# Patient Record
Sex: MALE | Race: WHITE | NOT HISPANIC OR LATINO | Employment: FULL TIME | URBAN - METROPOLITAN AREA
[De-identification: names, ages, dates, MRNs, and addresses within clinical notes are randomized per-mention and may not be internally consistent; named-entity substitution may affect disease eponyms.]

---

## 2017-01-05 ENCOUNTER — TRANSFERRED RECORDS (OUTPATIENT)
Dept: HEALTH INFORMATION MANAGEMENT | Facility: CLINIC | Age: 27
End: 2017-01-05

## 2017-01-09 ENCOUNTER — OFFICE VISIT (OUTPATIENT)
Dept: FAMILY MEDICINE | Facility: CLINIC | Age: 27
End: 2017-01-09
Payer: COMMERCIAL

## 2017-01-09 VITALS
OXYGEN SATURATION: 96 % | TEMPERATURE: 98 F | HEIGHT: 73 IN | BODY MASS INDEX: 31.51 KG/M2 | WEIGHT: 237.8 LBS | HEART RATE: 63 BPM | SYSTOLIC BLOOD PRESSURE: 120 MMHG | DIASTOLIC BLOOD PRESSURE: 72 MMHG

## 2017-01-09 DIAGNOSIS — I82.4Z9 DEEP VEIN THROMBOSIS (DVT) OF DISTAL VEIN OF LOWER EXTREMITY, UNSPECIFIED CHRONICITY, UNSPECIFIED LATERALITY (H): ICD-10-CM

## 2017-01-09 DIAGNOSIS — Z01.818 PREOP GENERAL PHYSICAL EXAM: Primary | ICD-10-CM

## 2017-01-09 DIAGNOSIS — S93.401A SPRAIN OF RIGHT ANKLE, UNSPECIFIED LIGAMENT, INITIAL ENCOUNTER: ICD-10-CM

## 2017-01-09 LAB — HGB BLD-MCNC: 14.2 G/DL (ref 13.3–17.7)

## 2017-01-09 PROCEDURE — 85018 HEMOGLOBIN: CPT | Performed by: PHYSICIAN ASSISTANT

## 2017-01-09 PROCEDURE — 80048 BASIC METABOLIC PNL TOTAL CA: CPT | Performed by: PHYSICIAN ASSISTANT

## 2017-01-09 PROCEDURE — 99214 OFFICE O/P EST MOD 30 MIN: CPT | Performed by: PHYSICIAN ASSISTANT

## 2017-01-09 PROCEDURE — 36415 COLL VENOUS BLD VENIPUNCTURE: CPT | Performed by: PHYSICIAN ASSISTANT

## 2017-01-09 NOTE — NURSING NOTE
"Chief Complaint   Patient presents with     Pre-Op Exam       Initial /72 mmHg  Pulse 63  Temp(Src) 98  F (36.7  C) (Oral)  Ht 6' 1\" (1.854 m)  Wt 237 lb 12.8 oz (107.865 kg)  BMI 31.38 kg/m2  SpO2 96% Estimated body mass index is 31.38 kg/(m^2) as calculated from the following:    Height as of this encounter: 6' 1\" (1.854 m).    Weight as of this encounter: 237 lb 12.8 oz (107.865 kg).  BP completed using cuff size: mike aPul CMA        "

## 2017-01-09 NOTE — PROGRESS NOTES
[facility]  [address] [address] [phone]  Dept: [phone]    PRE-OP EVALUATION:  Today's date: 2017    Monty Evans (: 1990) presents for pre-operative evaluation assessment as requested by Dr. Stearns.  He requires evaluation and anesthesia risk assessment prior to undergoing surgery/procedure for treatment of  left ankle .  Proposed procedure: full reconstructive surgery of left ankle    Date of Surgery/ Procedure: 17  Time of Surgery/ Procedure: 9:00am  Hospital[facility]Surgical [facility]  Fax number for surgical facility: [phone]  Primary Physician: Sarah Rhodes  Type of Anesthesia Anticipated: to be determined    Patient has a Health Care Directive or Living Will:  NO    1. NO - Do you have a history of heart attack, stroke, stent, bypass or surgery on an artery in the head, neck, heart or legs?  2. NO - Do you ever have any pain or discomfort in your chest?  3. NO - Do you have a history of  Heart Failure?  4. NO - Are you troubled by shortness of breath when: walking on the level, up a slight hill or at night?  5. NO - Do you currently have a cold, bronchitis or other respiratory infection?  6. NO - Do you have a cough, shortness of breath or wheezing?  7. NO - Do you sometimes get pains in the calves of your legs when you walk?  8. NO - Do you or anyone in your family have previous history of blood clots?  9. NO - Do you or does anyone in your family have a serious bleeding problem such as prolonged bleeding following surgeries or cuts?  10. NO - Have you ever had problems with anemia or been told to take iron pills?  11. NO - Have you had any abnormal blood loss such as black, tarry or bloody stools, or abnormal vaginal bleeding?  12. NO - Have you ever had a blood transfusion?  13. NO - Have you or any of your relatives ever had problems with anesthesia?  14. YES - DO YOU HAVE SLEEP  APNEA, EXCESSIVE SNORING OR DAYTIME DROWSINESS? Does snore quite regularly, no dx of GRAY  15. NO - Do you have any prosthetic heart valves?  16. NO - Do you have prosthetic joints?  17. NO - Is there any chance that you may be pregnant?      HPI:                                                      Brief HPI related to upcoming procedure: Patient was playing softball when he injured his left ankle injury - he notes he reportedly tore all of his supporting ligaments. Did a lot physical therapy initially but this was not fully effective. He has pain daily when walking      See problem list for active medical problems.  Problems all longstanding and stable, except as noted/documented.  See ROS for pertinent symptoms related to these conditions.                                                                                                  .    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 Provider to review        Past Medical History   Diagnosis Date     DVT, lower extremity (H) 3/19/2014     Past Surgical History   Procedure Laterality Date     Shoulder surgery  2008     labral tear-right -surgery     Surgical history of -   3/14/14     Right ACL repair     Current Outpatient Prescriptions   Medication Sig Dispense Refill     acetaminophen (TYLENOL) 325  "MG tablet Take 2 tablets (650 mg) by mouth every 4 hours as needed for mild pain 100 tablet 0     EPINEPHrine (EPIPEN) 0.3 MG/0.3ML injection Inject 0.3 mLs into the muscle once as needed. 2 each 1     OTC products: None, except as noted above    Allergies   Allergen Reactions     Ibuprofen Sodium Anaphylaxis     Bacitracin      Neosporin [Neomycin-Polymyx-Gramicid]       Latex Allergy: NO    Social History   Substance Use Topics     Smoking status: Never Smoker      Smokeless tobacco: Never Used     Alcohol Use: 2.5 oz/week     5 drink(s) per week      Comment: 1-2 weekly     History   Drug Use No       REVIEW OF SYSTEMS:                                                    C: NEGATIVE for fever, chills, change in weight  I: NEGATIVE for worrisome rashes, moles or lesions  E: NEGATIVE for vision changes or irritation  E/M: NEGATIVE for ear, mouth and throat problems  R: NEGATIVE for significant cough or SOB  RESP:NEGATIVE for significant cough or SOB  B: NEGATIVE for masses, tenderness or discharge  CV: NEGATIVE for chest pain, palpitations or peripheral edema  GI: NEGATIVE for nausea, abdominal pain, heartburn, or change in bowel habits  : NEGATIVE for frequency, dysuria, or hematuria  MUSCULOSKELETAL: bilateral ankle pain  N: NEGATIVE for weakness, dizziness or paresthesias  E: NEGATIVE for temperature intolerance, skin/hair changes  HEME/ALLERGY/IMMUNE: POSITIVE for dvt post-op acl repair  P: NEGATIVE for changes in mood or affect    EXAM:                                                    /72 mmHg  Pulse 63  Temp(Src) 98  F (36.7  C) (Oral)  Ht 6' 1\" (1.854 m)  Wt 237 lb 12.8 oz (107.865 kg)  BMI 31.38 kg/m2  SpO2 96%    GENERAL APPEARANCE: healthy, alert and no distress     EYES: EOMI, - PERRL     HENT: ear canals and TM's normal and nose and mouth without ulcers or lesions     RESP: lungs clear to auscultation - no rales, rhonchi or wheezes     CV: regular rates and rhythm, normal S1 S2, no S3 or " S4 and no murmur, click or rub -     ABDOMEN:  soft, nontender, no HSM or masses and bowel sounds normal     MS: no gross swelling to the bilateral ankles, without peripheral edema; no evidence for mass  in LE     SKIN: no suspicious lesions or rashes     PSYCH: mentation appears normal. and affect normal/bright    DIAGNOSTICS:                                                      Labs Resulted Today:   Results for orders placed or performed in visit on 01/09/17   Hemoglobin   Result Value Ref Range    Hemoglobin 14.2 13.3 - 17.7 g/dL   Basic metabolic panel   Result Value Ref Range    Sodium 139 133 - 144 mmol/L    Potassium 4.1 3.4 - 5.3 mmol/L    Chloride 104 94 - 109 mmol/L    Carbon Dioxide 26 20 - 32 mmol/L    Anion Gap 9 3 - 14 mmol/L    Glucose 81 70 - 99 mg/dL    Urea Nitrogen 22 7 - 30 mg/dL    Creatinine 1.20 0.66 - 1.25 mg/dL    GFR Estimate 73 >60 mL/min/1.7m2    GFR Estimate If Black 88 >60 mL/min/1.7m2    Calcium 9.4 8.5 - 10.1 mg/dL       IMPRESSION:                                                    Reason for surgery/procedure: Left ankle reconstruction/repair  Diagnosis/reason for consult: Pre-operative evaluation    The proposed surgical procedure is considered INTERMEDIATE risk.    REVISED CARDIAC RISK INDEX  The patient has the following serious cardiovascular risks for perioperative complications such as: Hx of DVT following acl repair    INTERPRETATION: 1 risks: Class II (low risk - 0.9% complication rate)    The patient has the following additional risks for perioperative complications:  No identified additional risks      ICD-10-CM    1. Preop general physical exam Z01.818 Hemoglobin     Basic metabolic panel     DISCONTINUED: enoxaparin (LOVENOX) 40 MG/0.4ML injection   2. Sprain of right ankle, unspecified ligament, initial encounter S93.401A    3. Deep vein thrombosis (DVT) of distal vein of lower extremity, unspecified chronicity, unspecified laterality (H) I82.4Z9 Hemoglobin     Basic  metabolic panel     enoxaparin (LOVENOX) 40 MG/0.4ML injection     DISCONTINUED: enoxaparin (LOVENOX) 40 MG/0.4ML injection       RECOMMENDATIONS:                                                      --Because of DVT, patient will be started on low molecular weight heparin after surgery    Obstructive Sleep Apnea (or suspected sleep apnea)  Hospital staff are advised to monitor for sleep related oxygen desaturations due to suspicion of GRAY - patient reported    --Patient is to take all scheduled medications on the day of surgery EXCEPT for modifications listed below - Patient does NOT take medications currently    APPROVAL GIVEN to proceed with proposed procedure, without further diagnostic evaluation       Signed Electronically by: Jaleel Lucas PA-C    Copy of this evaluation report is provided to requesting physician.    Amy Preop Guidelines

## 2017-01-09 NOTE — MR AVS SNAPSHOT
After Visit Summary   1/9/2017    Monty Evans    MRN: [id number]           Patient Information     Date Of Birth          1990        Visit Information        Provider Department      1/9/2017 4:00 PM Jaleel Lucas PA-C [facility]        Today's Diagnoses     Preop general physical exam    -  1     Sprain of right ankle, unspecified ligament, initial encounter         Deep vein thrombosis (DVT) of distal vein of lower extremity, unspecified chronicity, unspecified laterality (H)           Care Instructions      Before Your Surgery      Call your surgeon if there is any change in your health. This includes signs of a cold or flu (such as a sore throat, runny nose, cough, rash or fever).    Do not smoke, drink alcohol or take over the counter medicine (unless your surgeon or primary care doctor tells you to) for the 24 hours before and after surgery.    If you take prescribed drugs: Follow your doctor s orders about which medicines to take and which to stop until after surgery.    Eating and drinking prior to surgery: follow the instructions from your surgeon    Take a shower or bath the night before surgery. Use the soap your surgeon gave you to gently clean your skin. If you do not have soap from your surgeon, use your regular soap. Do not shave or scrub the surgery site.  Wear clean pajamas and have clean sheets on your bed.         Follow-ups after your visit        Who to contact     If you have questions or need follow up information about today's clinic visit or your schedule please contact [facility] directly at [phone].  Normal or non-critical lab and imaging results will be communicated to you by MyChart, letter or phone within 4 business days after the clinic has received the results. If you do not hear from us within 7 days, please contact the clinic through MyChart or phone. If you have a critical or abnormal lab result, we will notify  "you by phone as soon as possible.  Submit refill requests through [employer] or call your pharmacy and they will forward the refill request to us. Please allow 3 business days for your refill to be completed.          Additional Information About Your Visit        Email Data [employer] Information     [employer] lets you send messages to your doctor, view your test results, renew your prescriptions, schedule appointments and more. To sign up, go to [email or web address] . Click on \"Log in\" on the left side of the screen, which will take you to the Welcome page. Then click on \"Sign up Now\" on the right side of the page.     You will be asked to enter the access code listed below, as well as some personal information. Please follow the directions to create your username and password.     Your access code is: [email or web address]  Expires: 2017  4:33 PM     Your access code will  in 90 days. If you need help or a new code, please call your [place] clinic or [phone].        Care EveryWhere ID     This is your Care EveryWhere ID. This could be used by other organizations to access your [place] medical records  [id number]        Your Vitals Were     Pulse Temperature Height BMI (Body Mass Index) Pulse Oximetry       63 98  F (36.7  C) (Oral) 6' 1\" (1.854 m) 31.38 kg/m2 96%        Blood Pressure from Last 3 Encounters:   17 120/72   14 118/62   14 112/70    Weight from Last 3 Encounters:   17 237 lb 12.8 oz (107.865 kg)   14 211 lb 11.2 oz (96.026 kg)   14 211 lb (95.709 kg)              We Performed the Following     Basic metabolic panel     Hemoglobin          Today's Medication Changes          These changes are accurate as of: 17  4:33 PM.  If you have any questions, ask your nurse or doctor.               Start taking these medicines.        Dose/Directions    enoxaparin 40 MG/0.4ML injection   Commonly known as:  LOVENOX   Used for:  Deep vein thrombosis (DVT) of distal vein of lower " extremity, unspecified chronicity, unspecified laterality (H), Preop general physical exam   Started by:  Jaleel Lucas PA-C        Dose:  1 mg/kg   Inject 1.08 mLs (108 mg) Subcutaneous daily for 14 days   Quantity:  15.12 mL   Refills:  0            Where to get your medicines      These medications were sent to [facility] [place] - [address]  [address] [address] [address]     Phone:  [phone]    - enoxaparin 40 MG/0.4ML injection             Primary Care Provider Office Phone # Fax #    Sarah Rhodes [phone]       [facility] [address] [facility] [address] [address]        Thank you!     Thank you for choosing [facility]  for your care. Our goal is always to provide you with excellent care. Hearing back from our patients is one way we can continue to improve our services. Please take a few minutes to complete the written survey that you may receive in the mail after your visit with us. Thank you!             Your Updated Medication List - Protect others around you: Learn how to safely use, store and throw away your medicines at www.disposemymeds.org.          This list is accurate as of: 1/9/17  4:33 PM.  Always use your most recent med list.                   Brand Name Dispense Instructions for use    acetaminophen 325 MG tablet    TYLENOL    100 tablet    Take 2 tablets (650 mg) by mouth every 4 hours as needed for mild pain       enoxaparin 40 MG/0.4ML injection    LOVENOX    15.12 mL    Inject 1.08 mLs (108 mg) Subcutaneous daily for 14 days       EPINEPHrine 0.3 MG/0.3ML injection    EPIPEN    2 each    Inject 0.3 mLs into the muscle once as needed.

## 2017-01-10 LAB
ANION GAP SERPL CALCULATED.3IONS-SCNC: 9 MMOL/L (ref 3–14)
BUN SERPL-MCNC: 22 MG/DL (ref 7–30)
CALCIUM SERPL-MCNC: 9.4 MG/DL (ref 8.5–10.1)
CHLORIDE SERPL-SCNC: 104 MMOL/L (ref 94–109)
CO2 SERPL-SCNC: 26 MMOL/L (ref 20–32)
CREAT SERPL-MCNC: 1.2 MG/DL (ref 0.66–1.25)
GFR SERPL CREATININE-BSD FRML MDRD: 73 ML/MIN/1.7M2
GLUCOSE SERPL-MCNC: 81 MG/DL (ref 70–99)
POTASSIUM SERPL-SCNC: 4.1 MMOL/L (ref 3.4–5.3)
SODIUM SERPL-SCNC: 139 MMOL/L (ref 133–144)

## 2017-02-16 ENCOUNTER — THERAPY VISIT (OUTPATIENT)
Dept: PHYSICAL THERAPY | Facility: CLINIC | Age: 27
End: 2017-02-16
Payer: COMMERCIAL

## 2017-02-16 DIAGNOSIS — Z47.89 AFTERCARE FOLLOWING SURGERY OF THE MUSCULOSKELETAL SYSTEM: ICD-10-CM

## 2017-02-16 DIAGNOSIS — M25.572 ACUTE LEFT ANKLE PAIN: Primary | ICD-10-CM

## 2017-02-16 PROCEDURE — 97110 THERAPEUTIC EXERCISES: CPT | Mod: GP | Performed by: PHYSICAL THERAPIST

## 2017-02-16 PROCEDURE — 97161 PT EVAL LOW COMPLEX 20 MIN: CPT | Mod: GP | Performed by: PHYSICAL THERAPIST

## 2017-02-16 NOTE — MR AVS SNAPSHOT
After Visit Summary   2/16/2017    Monty Evans    MRN: [id number]           Patient Information     Date Of Birth          1990        Visit Information        Provider Department      2/16/2017 8:00 AM Anastacia Heredia, PT [facility] [place] [facility]        Today's Diagnoses     Acute left ankle pain    -  1    Aftercare following surgery of the musculoskeletal system           Follow-ups after your visit        Your next 10 appointments already scheduled     Feb 23, 2017  8:40 AM CST   NATA Extremity with Anastacia Heredia PT   [facility] [place] [facility] ([facility] [place])    [address]  [place] [address]   [phone]            Mar 03, 2017  8:20 AM CST   NATA Extremity with Anastacia Heredia PT   [facility] [place] [facility] ([facility] [place])    [address]  [place] [address]   [phone]            Mar 09, 2017  8:40 AM CST   NATA Extremity with Anastacia Heredia PT   [facility] [place] [facility] ([facility] [place])    [address]  [place] [address]   [phone]            Mar 16, 2017  8:40 AM CDT   NATA Extremity with Anastacia Heredia PT   [facility] [place] [facility] ([facility] [place])    [address]  [place] [address]   [phone]            Mar 23, 2017  8:40 AM CDT   NATA Extremity with Anastacia Heredia PT   [facility] [place] [facility] ([facility] [place])    [address]  [place] [address]   [phone]            Mar 30, 2017  8:40 AM CDT   NATA Extremity with Anastacia Heredia PT   [facility] [place] [facility] ([facility] [place])    [address]  [place] [address]   [phone]              Who to contact     If you have questions or need follow up information about today's clinic visit or your schedule please contact [facility] directly at [phone].  Normal or non-critical lab and imaging  "results will be communicated to you by MyChart, letter or phone within 4 business days after the clinic has received the results. If you do not hear from us within 7 days, please contact the clinic through [employer] or phone. If you have a critical or abnormal lab result, we will notify you by phone as soon as possible.  Submit refill requests through [employer] or call your pharmacy and they will forward the refill request to us. Please allow 3 business days for your refill to be completed.          Additional Information About Your Visit        [employer] Information     [employer] lets you send messages to your doctor, view your test results, renew your prescriptions, schedule appointments and more. To sign up, go to [email or web address] . Click on \"Log in\" on the left side of the screen, which will take you to the Welcome page. Then click on \"Sign up Now\" on the right side of the page.     You will be asked to enter the access code listed below, as well as some personal information. Please follow the directions to create your username and password.     Your access code is: [email or web address]  Expires: 2017  4:33 PM     Your access code will  in 90 days. If you need help or a new code, please call your [place] clinic or [phone].        Care EveryWhere ID     This is your Care EveryWhere ID. This could be used by other organizations to access your [place] medical records  [id number]         Blood Pressure from Last 3 Encounters:   17 120/72   14 118/62   14 112/70    Weight from Last 3 Encounters:   17 107.9 kg (237 lb 12.8 oz)   14 96 kg (211 lb 11.2 oz)   14 95.7 kg (211 lb)              We Performed the Following     HC PT EVAL, LOW COMPLEXITY     NATA INITIAL EVAL REPORT     THERAPEUTIC EXERCISES        Primary Care Provider Office Phone # Fax #    Sarah Rhodes [phone]       [facility] [address] [address] [facility] [address]      "   Thank you!     Thank you for choosing [facility]  for your care. Our goal is always to provide you with excellent care. Hearing back from our patients is one way we can continue to improve our services. Please take a few minutes to complete the written survey that you may receive in the mail after your visit with us. Thank you!             Your Updated Medication List - Protect others around you: Learn how to safely use, store and throw away your medicines at www.disposemymeds.org.          This list is accurate as of: 2/16/17  9:22 AM.  Always use your most recent med list.                   Brand Name Dispense Instructions for use    acetaminophen 325 MG tablet    TYLENOL    100 tablet    Take 2 tablets (650 mg) by mouth every 4 hours as needed for mild pain       EPINEPHrine 0.3 MG/0.3ML injection    EPIPEN    2 each    Inject 0.3 mLs into the muscle once as needed.

## 2017-02-16 NOTE — LETTER
[facility] [place] [facility]  [address]  [place] [address]  [phone]    2017    Re: Monty Evans :   1990  MRN:  [id number]   REFERRING PHYSICIAN:   Bety Stearns  [facility]  Date of Initial Evaluation:  2017  Visits:  Rxs Used: 1  Reason for Referral:   Acute left ankle pain  Aftercare following surgery of the musculoskeletal system    EVALUATION SUMMARY  Subjective:  Monty Evans is a 27 year old male with a left ankle condition.  Condition occurred with:  A twist.  Condition occurred: during recreation/sport.  This is a new condition  Injury last fall playing softball, recent surgery 17.    Patient reports pain:  Lateral.    Pain is described as aching and is intermittent and reported as 5/10.  Associated symptoms:  Loss of motion/stiffness and loss of strength. Pain is the same all the time.  Symptoms are exacerbated by standing, walking, ascending stairs, descending stairs, bending/squatting and running   Since onset symptoms are gradually improving.  Special tests:  X-ray.      General health as reported by patient is good.  Pertinent medical history includes:  None.  Medical allergies: no.  Other surgeries include:  Orthopedic surgery.  Current medications:  None as reported by patient.  Current occupation is .  Patient is currently not working due to present treatment problem.  Primary job tasks include:  Prolonged standing, lifting, repetitive tasks and operating a machine. Barriers include:  None as reported by patient.  Red flags:  None as reported by patient.    Objective:  Ankle/Foot Evaluation  ROM:    AROM:    Dorsiflexion:  Left:   6  Right:   20  Plantarflexion:  Left:  26    Right:  42  Inversion:  Left:  6     Right:  36  Eversion:  6     Right:  12  Strength is not assessed.  LIGAMENT TESTING: not assessed  SPECIAL TESTS: not assessed  EDEMA:   Left ankle edema present at:  general  Hip Evaluation  Hip Strength:    Flexion:   Left: 5/5   Pain:  Right: 5/5   Pain:  Extension:  Left: 4/5  Pain:Right: 5/5    Pain:    Abduction:  Left: 5/5     Pain:Right: 5/5    Pain:  Internal Rotation:  Left: 5/5    Pain:Right: 5/5   Pain:  External Rotation:  Left: 5/5   Pain:  Right: 5/5   Pain:  Knee Flexion:  Left: 5/5   Pain:Right: 5/5   Pain:  Knee Extension:  Left: 5/5   Pain:Right: 5/5    Pain:  Assessment/Plan:    Patient is a 27 year old male with left side ankle complaints.    Patient has the following significant findings with corresponding treatment plan.       Re: Monty Evans  :   1990             Diagnosis 1:  S/p L lateral ankle repair  Pain -  hot/cold therapy, manual therapy, education, directional preference exercise and home program  Decreased ROM/flexibility - manual therapy, therapeutic exercise and home program  Decreased joint mobility - manual therapy, therapeutic exercise and home program  Decreased strength - therapeutic exercise, therapeutic activities and home program  Inflammation - cold therapy and self management/home program  Impaired gait - gait training and home program  Impaired muscle performance - neuro re-education and home program  Decreased function - therapeutic activities and home program  Therapy Evaluation Codes:   1) History comprised of:   Personal factors that impact the plan of care:      None.    Comorbidity factors that impact the plan of care are:      None.     Medications impacting care: None.  2) Examination of Body Systems comprised of:   Body structures and functions that impact the plan of care:      Ankle.   Activity limitations that impact the plan of care are:      Jumping, Running, Squatting/kneeling, Stairs, Standing, Walking and Working.  3) Clinical presentation characteristics are:   Stable/Uncomplicated.  4) Decision-Making    Low complexity using standardized patient assessment instrument and/or measureable assessment of  functional outcome.  Cumulative Therapy Evaluation is: Low complexity.  Previous and current functional limitations:  (See Goal Flow Sheet for this information)    Short term and Long term goals: (See Goal Flow Sheet for this information)   Communication ability:  Patient appears to be able to clearly communicate and understand verbal and written communication and follow directions correctly.  Treatment Explanation - The following has been discussed with the patient:   RX ordered/plan of care. Anticipated outcomes. Possible risks and side effects  This patient would benefit from PT intervention to resume normal activities.   Rehab potential is excellent.  Frequency:  1 X week, once daily  Duration:  for 6 weeks tapering to 2-3 X a month over 8 weeks  Discharge Plan:  Achieve all LTG.  Independent in home treatment program.  Reach maximal therapeutic benefit.       Thank you for your referral.    INQUIRIES  Therapist: Anastacia Heredia PT   [facility]  [address] [address]  Phone: [phone]  Fax: [phone]

## 2017-02-16 NOTE — PROGRESS NOTES
Subjective:    Monty Evans is a 27 year old male with a left ankle condition.  Condition occurred with:  A twist.  Condition occurred: during recreation/sport.  This is a new condition  Injury last fall playing softball, recent surgery 1/11/17.    Patient reports pain:  Lateral.    Pain is described as aching and is intermittent and reported as 5/10.  Associated symptoms:  Loss of motion/stiffness and loss of strength. Pain is the same all the time.  Symptoms are exacerbated by standing, walking, ascending stairs, descending stairs, bending/squatting and running   Since onset symptoms are gradually improving.  Special tests:  X-ray.      General health as reported by patient is good.  Pertinent medical history includes:  None.  Medical allergies: no.  Other surgeries include:  Orthopedic surgery.  Current medications:  None as reported by patient.  Current occupation is .  Patient is currently not working due to present treatment problem.  Primary job tasks include:  Prolonged standing, lifting, repetitive tasks and operating a machine.    Barriers include:  None as reported by patient.    Red flags:  None as reported by patient.                      Objective:    System    Ankle/Foot Evaluation  ROM:    AROM:    Dorsiflexion:  Left:   6  Right:   20  Plantarflexion:  Left:  26    Right:  42  Inversion:  Left:  6     Right:  36  Eversion:  6     Right:  12        Strength is not assessed.  LIGAMENT TESTING: not assessed              SPECIAL TESTS: not assessed      EDEMA:   Left ankle edema present at: general                                                   Hip Evaluation    Hip Strength:    Flexion:   Left: 5/5   Pain:  Right: 5/5   Pain:                    Extension:  Left: 4/5  Pain:Right: 5/5    Pain:    Abduction:  Left: 5/5     Pain:Right: 5/5    Pain:    Internal Rotation:  Left: 5/5    Pain:Right: 5/5   Pain:  External Rotation:  Left: 5/5   Pain:  Right: 5/5   Pain:  Knee Flexion:  Left: 5/5    Pain:Right: 5/5   Pain:  Knee Extension:  Left: 5/5   Pain:Right: 5/5    Pain:                       General     ROS    Assessment/Plan:      Patient is a 27 year old male with left side ankle complaints.    Patient has the following significant findings with corresponding treatment plan.                Diagnosis 1:  S/p L lateral ankle repair  Pain -  hot/cold therapy, manual therapy, education, directional preference exercise and home program  Decreased ROM/flexibility - manual therapy, therapeutic exercise and home program  Decreased joint mobility - manual therapy, therapeutic exercise and home program  Decreased strength - therapeutic exercise, therapeutic activities and home program  Inflammation - cold therapy and self management/home program  Impaired gait - gait training and home program  Impaired muscle performance - neuro re-education and home program  Decreased function - therapeutic activities and home program    Therapy Evaluation Codes:   1) History comprised of:   Personal factors that impact the plan of care:      None.    Comorbidity factors that impact the plan of care are:      None.     Medications impacting care: None.  2) Examination of Body Systems comprised of:   Body structures and functions that impact the plan of care:      Ankle.   Activity limitations that impact the plan of care are:      Jumping, Running, Squatting/kneeling, Stairs, Standing, Walking and Working.  3) Clinical presentation characteristics are:   Stable/Uncomplicated.  4) Decision-Making    Low complexity using standardized patient assessment instrument and/or measureable assessment of functional outcome.  Cumulative Therapy Evaluation is: Low complexity.    Previous and current functional limitations:  (See Goal Flow Sheet for this information)    Short term and Long term goals: (See Goal Flow Sheet for this information)     Communication ability:  Patient appears to be able to clearly communicate and understand  verbal and written communication and follow directions correctly.  Treatment Explanation - The following has been discussed with the patient:   RX ordered/plan of care  Anticipated outcomes  Possible risks and side effects  This patient would benefit from PT intervention to resume normal activities.   Rehab potential is excellent.    Frequency:  1 X week, once daily  Duration:  for 6 weeks tapering to 2-3 X a month over 8 weeks  Discharge Plan:  Achieve all LTG.  Independent in home treatment program.  Reach maximal therapeutic benefit.    Please refer to the daily flowsheet for treatment today, total treatment time and time spent performing 1:1 timed codes.

## 2017-02-23 ENCOUNTER — THERAPY VISIT (OUTPATIENT)
Dept: PHYSICAL THERAPY | Facility: CLINIC | Age: 27
End: 2017-02-23
Payer: COMMERCIAL

## 2017-02-23 DIAGNOSIS — Z47.89 AFTERCARE FOLLOWING SURGERY OF THE MUSCULOSKELETAL SYSTEM: ICD-10-CM

## 2017-02-23 DIAGNOSIS — M25.572 ACUTE LEFT ANKLE PAIN: ICD-10-CM

## 2017-02-23 PROCEDURE — 97110 THERAPEUTIC EXERCISES: CPT | Mod: GP | Performed by: PHYSICAL THERAPIST

## 2017-02-23 PROCEDURE — 97112 NEUROMUSCULAR REEDUCATION: CPT | Mod: GP | Performed by: PHYSICAL THERAPIST

## 2017-03-03 ENCOUNTER — THERAPY VISIT (OUTPATIENT)
Dept: PHYSICAL THERAPY | Facility: CLINIC | Age: 27
End: 2017-03-03
Payer: COMMERCIAL

## 2017-03-03 DIAGNOSIS — M25.572 ACUTE LEFT ANKLE PAIN: ICD-10-CM

## 2017-03-03 DIAGNOSIS — Z47.89 AFTERCARE FOLLOWING SURGERY OF THE MUSCULOSKELETAL SYSTEM: ICD-10-CM

## 2017-03-03 PROCEDURE — 97110 THERAPEUTIC EXERCISES: CPT | Mod: GP | Performed by: PHYSICAL THERAPIST

## 2017-03-03 PROCEDURE — 97112 NEUROMUSCULAR REEDUCATION: CPT | Mod: GP | Performed by: PHYSICAL THERAPIST

## 2017-03-03 PROCEDURE — 97530 THERAPEUTIC ACTIVITIES: CPT | Mod: GP | Performed by: PHYSICAL THERAPIST

## 2017-03-09 ENCOUNTER — THERAPY VISIT (OUTPATIENT)
Dept: PHYSICAL THERAPY | Facility: CLINIC | Age: 27
End: 2017-03-09
Payer: COMMERCIAL

## 2017-03-09 DIAGNOSIS — Z47.89 AFTERCARE FOLLOWING SURGERY OF THE MUSCULOSKELETAL SYSTEM: ICD-10-CM

## 2017-03-09 DIAGNOSIS — M25.572 ACUTE LEFT ANKLE PAIN: ICD-10-CM

## 2017-03-09 PROCEDURE — 97112 NEUROMUSCULAR REEDUCATION: CPT | Mod: GP | Performed by: PHYSICAL THERAPIST

## 2017-03-09 PROCEDURE — 97110 THERAPEUTIC EXERCISES: CPT | Mod: GP | Performed by: PHYSICAL THERAPIST

## 2017-03-16 ENCOUNTER — THERAPY VISIT (OUTPATIENT)
Dept: PHYSICAL THERAPY | Facility: CLINIC | Age: 27
End: 2017-03-16
Payer: COMMERCIAL

## 2017-03-16 DIAGNOSIS — M25.572 ACUTE LEFT ANKLE PAIN: ICD-10-CM

## 2017-03-16 DIAGNOSIS — Z47.89 AFTERCARE FOLLOWING SURGERY OF THE MUSCULOSKELETAL SYSTEM: ICD-10-CM

## 2017-03-16 PROCEDURE — 97140 MANUAL THERAPY 1/> REGIONS: CPT | Mod: GP | Performed by: PHYSICAL THERAPIST

## 2017-03-16 PROCEDURE — 97110 THERAPEUTIC EXERCISES: CPT | Mod: GP | Performed by: PHYSICAL THERAPIST

## 2017-03-16 PROCEDURE — 97530 THERAPEUTIC ACTIVITIES: CPT | Mod: GP | Performed by: PHYSICAL THERAPIST

## 2017-03-23 ENCOUNTER — THERAPY VISIT (OUTPATIENT)
Dept: PHYSICAL THERAPY | Facility: CLINIC | Age: 27
End: 2017-03-23
Payer: COMMERCIAL

## 2017-03-23 DIAGNOSIS — Z47.89 AFTERCARE FOLLOWING SURGERY OF THE MUSCULOSKELETAL SYSTEM: ICD-10-CM

## 2017-03-23 DIAGNOSIS — M25.572 ACUTE LEFT ANKLE PAIN: ICD-10-CM

## 2017-03-23 PROCEDURE — 97530 THERAPEUTIC ACTIVITIES: CPT | Mod: GP | Performed by: PHYSICAL THERAPIST

## 2017-03-23 PROCEDURE — 97140 MANUAL THERAPY 1/> REGIONS: CPT | Mod: GP | Performed by: PHYSICAL THERAPIST

## 2017-03-23 PROCEDURE — 97110 THERAPEUTIC EXERCISES: CPT | Mod: GP | Performed by: PHYSICAL THERAPIST

## 2017-03-30 ENCOUNTER — THERAPY VISIT (OUTPATIENT)
Dept: PHYSICAL THERAPY | Facility: CLINIC | Age: 27
End: 2017-03-30
Payer: COMMERCIAL

## 2017-03-30 DIAGNOSIS — M25.572 ACUTE LEFT ANKLE PAIN: ICD-10-CM

## 2017-03-30 DIAGNOSIS — Z47.89 AFTERCARE FOLLOWING SURGERY OF THE MUSCULOSKELETAL SYSTEM: ICD-10-CM

## 2017-03-30 PROCEDURE — 97140 MANUAL THERAPY 1/> REGIONS: CPT | Mod: GP | Performed by: PHYSICAL THERAPIST

## 2017-03-30 PROCEDURE — 97530 THERAPEUTIC ACTIVITIES: CPT | Mod: GP | Performed by: PHYSICAL THERAPIST

## 2017-03-30 PROCEDURE — 97110 THERAPEUTIC EXERCISES: CPT | Mod: GP | Performed by: PHYSICAL THERAPIST

## 2018-03-19 ENCOUNTER — RECORDS - HEALTHEAST (OUTPATIENT)
Dept: LAB | Facility: CLINIC | Age: 28
End: 2018-03-19

## 2018-03-19 LAB
BASOPHILS # BLD AUTO: 0.1 THOU/UL (ref 0–0.2)
BASOPHILS NFR BLD AUTO: 1 % (ref 0–2)
EOSINOPHIL # BLD AUTO: 0.4 THOU/UL (ref 0–0.4)
EOSINOPHIL NFR BLD AUTO: 4 % (ref 0–6)
ERYTHROCYTE [DISTWIDTH] IN BLOOD BY AUTOMATED COUNT: 12.3 % (ref 11–14.5)
HCT VFR BLD AUTO: 48.1 % (ref 40–54)
HGB BLD-MCNC: 15.8 G/DL (ref 14–18)
LYMPHOCYTES # BLD AUTO: 1.6 THOU/UL (ref 0.8–4.4)
LYMPHOCYTES NFR BLD AUTO: 18 % (ref 20–40)
MCH RBC QN AUTO: 31.3 PG (ref 27–34)
MCHC RBC AUTO-ENTMCNC: 32.8 G/DL (ref 32–36)
MCV RBC AUTO: 95 FL (ref 80–100)
MONOCYTES # BLD AUTO: 1 THOU/UL (ref 0–0.9)
MONOCYTES NFR BLD AUTO: 11 % (ref 2–10)
NEUTROPHILS # BLD AUTO: 6.1 THOU/UL (ref 2–7.7)
NEUTROPHILS NFR BLD AUTO: 66 % (ref 50–70)
PLATELET # BLD AUTO: 323 THOU/UL (ref 140–440)
PMV BLD AUTO: 9.6 FL (ref 8.5–12.5)
RBC # BLD AUTO: 5.04 MILL/UL (ref 4.4–6.2)
WBC: 9.2 THOU/UL (ref 4–11)

## 2019-11-06 ENCOUNTER — OFFICE VISIT (OUTPATIENT)
Dept: FAMILY MEDICINE | Facility: CLINIC | Age: 29
End: 2019-11-06

## 2019-11-06 VITALS
WEIGHT: 226 LBS | BODY MASS INDEX: 30.61 KG/M2 | HEART RATE: 62 BPM | HEIGHT: 72 IN | SYSTOLIC BLOOD PRESSURE: 112 MMHG | OXYGEN SATURATION: 98 % | TEMPERATURE: 98 F | DIASTOLIC BLOOD PRESSURE: 72 MMHG

## 2019-11-06 DIAGNOSIS — Z71.89 ACP (ADVANCE CARE PLANNING): ICD-10-CM

## 2019-11-06 DIAGNOSIS — Z00.00 ROUTINE GENERAL MEDICAL EXAMINATION AT A HEALTH CARE FACILITY: Primary | ICD-10-CM

## 2019-11-06 DIAGNOSIS — Z86.718 PERSONAL HISTORY OF DVT (DEEP VEIN THROMBOSIS): ICD-10-CM

## 2019-11-06 DIAGNOSIS — R25.1 TREMOR: ICD-10-CM

## 2019-11-06 DIAGNOSIS — R06.83 SNORING: ICD-10-CM

## 2019-11-06 PROCEDURE — 90471 IMMUNIZATION ADMIN: CPT | Performed by: FAMILY MEDICINE

## 2019-11-06 PROCEDURE — 90715 TDAP VACCINE 7 YRS/> IM: CPT | Performed by: FAMILY MEDICINE

## 2019-11-06 PROCEDURE — 99385 PREV VISIT NEW AGE 18-39: CPT | Mod: 25 | Performed by: FAMILY MEDICINE

## 2019-11-06 ASSESSMENT — MIFFLIN-ST. JEOR: SCORE: 2024.16

## 2019-11-06 NOTE — NURSING NOTE
Oswald is here for a PX non fasting but would like fasting labs later.          Patient is here for a full physical exam.    Pre-Visit Screening :  Immunizations : not up to date-- will get tdap today  Colon Screening : NA  Mammogram: NA  Asthma Action Test/Plan : NA  PHQ9 :  None  GAD7 :  None  Patient's  BMI Body mass index is 30.87 kg/m .  Questioned patient about current smoking habits.  Pt. has never smoked.  OK to leave a detailed voice message regarding today's visit Yes, phone #       ETOH screening:  Questions:  1-How often do you have a drink containing alcohol?                             1 times per week(s)  2-How many drinks containing alcohol do you have on a typical day when you are         Drinking?                              1   3- How often do you have 5 or more drinks on one occasion?                              5x year

## 2019-11-06 NOTE — PROGRESS NOTES
3  SUBJECTIVE:   CC: Monty Evans is an 29 year old male who presents for preventive health visit.     Healthy Habits:    Do you get at least three servings of calcium containing foods daily (dairy, green leafy vegetables, etc.)? yes    Amount of exercise or daily activities, outside of work: 5 day(s) per week    Problems taking medications regularly not applicable    Medication side effects: No    Have you had an eye exam in the past two years? no    Do you see a dentist twice per year? yes    Do you have sleep apnea, excessive snoring or daytime drowsiness?yes-loud snoring, tired some during day    Pt feels his whole body shakes when he goes to bed, also notes upper extremity shaking on and off    He does take a preworkout supplement, minimal caffeine      Today's PHQ-2 Score:   PHQ-2 ( 1999 [employer]) 1/9/2017 3/26/2014   Q1: Little interest or pleasure in doing things 0 0   Q2: Feeling down, depressed or hopeless 0 0   PHQ-2 Score 0 0       Abuse: Current or Past(Physical, Sexual or Emotional)- No  Do you feel safe in your environment? Yes        Social History     Tobacco Use     Smoking status: Never Smoker     Smokeless tobacco: Never Used   Substance Use Topics     Alcohol use: Yes     Alcohol/week: 4.2 standard drinks     Types: 5 drink(s) per week     Comment: 1-2 weekly     If you drink alcohol do you typically have >3 drinks per day or >7 drinks per week? No                      Last PSA: No results found for: PSA    Reviewed orders with patient. Reviewed health maintenance and updated orders accordingly - Yes  BP Readings from Last 3 Encounters:   11/06/19 112/72   01/09/17 120/72   04/08/14 118/62    Wt Readings from Last 3 Encounters:   11/06/19 102.5 kg (226 lb)   01/09/17 107.9 kg (237 lb 12.8 oz)   04/08/14 96 kg (211 lb 11.2 oz)                  Patient Active Problem List   Diagnosis     Pain in joint, ankle and foot     Sprain of ankle     Anaphylactic reaction     Health Care Home     DVT,  lower extremity (H)     CARDIOVASCULAR SCREENING; LDL GOAL LESS THAN 160     Acute left ankle pain     Aftercare following surgery of the musculoskeletal system     ACP (advance care planning)     Personal history of DVT (deep vein thrombosis)     Past Surgical History:   Procedure Laterality Date     ANKLE SURGERY Left 2017     SHOULDER SURGERY  2008    labral tear-right -surgery     SURGICAL HISTORY OF -   3/14/14    Right ACL repair       Social History     Tobacco Use     Smoking status: Never Smoker     Smokeless tobacco: Never Used   Substance Use Topics     Alcohol use: Yes     Alcohol/week: 4.2 standard drinks     Types: 5 drink(s) per week     Comment: 1-2 weekly     Family History   Problem Relation Age of Onset     C.A.D. Maternal Grandmother      Diabetes Maternal Grandmother      Lymphoma Maternal Grandmother      Coronary Artery Disease Maternal Grandfather      Cerebrovascular Disease No family hx of      Cancer - colorectal No family hx of          Current Outpatient Medications   Medication Sig Dispense Refill     acetaminophen (TYLENOL) 325 MG tablet Take 2 tablets (650 mg) by mouth every 4 hours as needed for mild pain 100 tablet 0     EPINEPHrine (EPIPEN) 0.3 MG/0.3ML injection Inject 0.3 mLs into the muscle once as needed. 2 each 1     Allergies   Allergen Reactions     Ibuprofen Sodium Anaphylaxis     Bacitracin      Neosporin [Neomycin-Polymyx-Gramicid]      Recent Labs   Lab Test 01/09/17  1636 03/18/14  1810   CR 1.20 1.06   GFRESTIMATED 73 86   GFRESTBLACK 88 >90   POTASSIUM 4.1 4.5        Reviewed and updated as needed this visit by clinical staff  Allergies  Meds  Problems         Reviewed and updated as needed this visit by Provider        Past Medical History:   Diagnosis Date     DVT, lower extremity (H) 3/19/2014      Past Surgical History:   Procedure Laterality Date     ANKLE SURGERY Left 2017     SHOULDER SURGERY  2008    labral tear-right -surgery     SURGICAL HISTORY OF -    "3/14/14    Right ACL repair       ROS:  CONSTITUTIONAL: NEGATIVE for fever, chills, change in weight  INTEGUMENTARY/SKIN: NEGATIVE for worrisome rashes, moles or lesions  EYES: NEGATIVE for vision changes or irritation  ENT: NEGATIVE for ear, mouth and throat problems  RESP: NEGATIVE for significant cough or SOB  CV: NEGATIVE for chest pain, palpitations or peripheral edema  GI: NEGATIVE for nausea, abdominal pain, heartburn, or change in bowel habits   male: negative for dysuria, hematuria, decreased urinary stream, erectile dysfunction, urethral discharge  MUSCULOSKELETAL: NEGATIVE for significant arthralgias or myalgia  NEURO: NEGATIVE for weakness, dizziness or paresthesias  NEURO: tremors in wholebody  HEME/ALLERGY/IMMUNE: NEGATIVE for bleeding problems  PSYCHIATRIC: NEGATIVE for changes in mood or affect    OBJECTIVE:   /72 (BP Location: Right arm, Patient Position: Sitting, Cuff Size: Adult Large)   Pulse 62   Temp 98  F (36.7  C) (Oral)   Ht 1.822 m (5' 11.75\")   Wt 102.5 kg (226 lb)   SpO2 98%   BMI 30.87 kg/m    EXAM:  GENERAL: healthy, alert and no distress  EYES: Eyes grossly normal to inspection, PERRL and conjunctivae and sclerae normal  HENT: ear canals and TM's normal, nose and mouth without ulcers or lesions  NECK: no adenopathy, no asymmetry, masses, or scars and thyroid normal to palpation  RESP: lungs clear to auscultation - no rales, rhonchi or wheezes  CV: regular rate and rhythm, normal S1 S2, no S3 or S4, no murmur, click or rub, no peripheral edema and peripheral pulses strong  ABDOMEN: soft, nontender, no hepatosplenomegaly, no masses and bowel sounds normal   (male): normal male genitalia without lesions or urethral discharge, no hernia  RECTAL (male): deferred  MS: no gross musculoskeletal defects noted, no edema  SKIN: no suspicious lesions or rashes  NEURO: Normal strength and tone, mentation intact and speech normal  PSYCH: mentation appears normal, affect " "normal/bright    Diagnostic Test Results:  Labs reviewed in Epic    ASSESSMENT/PLAN:   (Z00.00) Routine general medical examination at a health care facility  (primary encounter diagnosis)  Comment: discussed preventitive healthcare   Plan: Lipid Panel ([facility]), Comprehensive Metobolic         Panel ([facility]), VENOUS COLLECTION        Continue to work on healthy diet and exercise, discussed healthy habits     (Z71.89) ACP (advance care planning)  Comment:   Plan:     (Z86.308) Personal history of DVT (deep vein thrombosis)  Comment: knee surgery felt to be inciting event, w/u neg through hematology  Plan:     (R06.83) Snoring  Comment: possible GRAY based on description of symptoms   Plan: PULMONARY MEDICINE REFERRAL            (R25.1) Tremor  Comment: pt relates full body tremor sensation at night and right hand tremor at times, may be familial but no FH, discussed neruology eval and he would like to check this routs  Plan: NEUROLOGY ADULT REFERRAL              COUNSELING:  Reviewed preventive health counseling, as reflected in patient instructions       Regular exercise       Healthy diet/nutrition       Vision screening    Estimated body mass index is 30.87 kg/m  as calculated from the following:    Height as of this encounter: 1.822 m (5' 11.75\").    Weight as of this encounter: 102.5 kg (226 lb).    Weight management plan: Discussed healthy diet and exercise guidelines     reports that he has never smoked. He has never used smokeless tobacco.      Counseling Resources:  ATP IV Guidelines  Pooled Cohorts Equation Calculator  FRAX Risk Assessment  ICSI Preventive Guidelines  Dietary Guidelines for Americans, 2010  USDA's MyPlate  ASA Prophylaxis  Lung CA Screening    Fabian Eugene MD  [facility]  "

## 2019-11-16 DIAGNOSIS — Z00.00 ROUTINE GENERAL MEDICAL EXAMINATION AT A HEALTH CARE FACILITY: ICD-10-CM

## 2019-11-16 PROCEDURE — 36415 COLL VENOUS BLD VENIPUNCTURE: CPT | Performed by: FAMILY MEDICINE

## 2019-11-16 PROCEDURE — 80061 LIPID PANEL: CPT | Performed by: FAMILY MEDICINE

## 2019-11-16 PROCEDURE — 80053 COMPREHEN METABOLIC PANEL: CPT | Performed by: FAMILY MEDICINE

## 2019-11-18 LAB
ALBUMIN SERPL-MCNC: 4.5 G/DL (ref 3.6–5.1)
ALBUMIN/GLOB SERPL: 1.6 {RATIO} (ref 1–2.5)
ALP SERPL-CCNC: 33 U/L (ref 33–130)
ALT 1742-6: 14 U/L (ref 5–30)
AST 1920-8: 30 U/L (ref 7–31)
BILIRUB SERPL-MCNC: 0.7 MG/DL (ref 0.2–1.2)
BUN SERPL-MCNC: 13 MG/DL (ref 7–25)
BUN/CREATININE RATIO: 12.3 (ref 6–22)
CALCIUM SERPL-MCNC: 9.5 MG/DL (ref 8.6–10.3)
CHLORIDE SERPLBLD-SCNC: 106.5 MMOL/L (ref 98–110)
CHOLEST SERPL-MCNC: 197 MG/DL (ref 0–199)
CHOLEST/HDLC SERPL: 3 {RATIO} (ref 0–5)
CO2 SERPL-SCNC: 29.6 MMOL/L (ref 20–32)
CREAT SERPL-MCNC: 1.06 MG/DL (ref 0.7–1.18)
GLOBULIN, CALCULATED - QUEST: 2.9 (ref 1.9–3.7)
GLUCOSE SERPL-MCNC: 98 MG/DL (ref 60–99)
HDLC SERPL-MCNC: 62 MG/DL (ref 40–150)
LDLC SERPL CALC-MCNC: 122 MG/DL (ref 0–130)
POTASSIUM SERPL-SCNC: 4.33 MMOL/L (ref 3.5–5.3)
PROT SERPL-MCNC: 7.4 G/DL (ref 6.1–8.1)
SODIUM SERPL-SCNC: 144.6 MMOL/L (ref 135–146)
TRIGL SERPL-MCNC: 65 MG/DL (ref 0–149)

## 2019-12-12 ENCOUNTER — TRANSFERRED RECORDS (OUTPATIENT)
Dept: FAMILY MEDICINE | Facility: CLINIC | Age: 29
End: 2019-12-12

## 2019-12-17 ENCOUNTER — OFFICE VISIT (OUTPATIENT)
Dept: FAMILY MEDICINE | Facility: CLINIC | Age: 29
End: 2019-12-17

## 2019-12-17 VITALS
WEIGHT: 230 LBS | DIASTOLIC BLOOD PRESSURE: 70 MMHG | BODY MASS INDEX: 31.41 KG/M2 | SYSTOLIC BLOOD PRESSURE: 118 MMHG | HEART RATE: 86 BPM | OXYGEN SATURATION: 97 % | TEMPERATURE: 98.8 F

## 2019-12-17 DIAGNOSIS — T78.2XXA IDIOPATHIC ANAPHYLAXIS, INITIAL ENCOUNTER: ICD-10-CM

## 2019-12-17 DIAGNOSIS — J02.0 STREPTOCOCCAL SORE THROAT: Primary | ICD-10-CM

## 2019-12-17 LAB — S PYO AG THROAT QL IA.RAPID: ABNORMAL

## 2019-12-17 PROCEDURE — 87880 STREP A ASSAY W/OPTIC: CPT | Performed by: PHYSICIAN ASSISTANT

## 2019-12-17 PROCEDURE — 99213 OFFICE O/P EST LOW 20 MIN: CPT | Performed by: PHYSICIAN ASSISTANT

## 2019-12-17 RX ORDER — PENICILLIN V POTASSIUM 500 MG/1
500 TABLET, FILM COATED ORAL 2 TIMES DAILY
Qty: 20 TABLET | Refills: 0 | Status: SHIPPED | OUTPATIENT
Start: 2019-12-17 | End: 2023-01-13

## 2019-12-17 RX ORDER — EPINEPHRINE 0.3 MG/.3ML
0.3 INJECTION SUBCUTANEOUS PRN
Qty: 2 EACH | Refills: 0 | Status: SHIPPED | OUTPATIENT
Start: 2019-12-17 | End: 2023-01-13

## 2019-12-17 NOTE — PROGRESS NOTES
SUBJECTIVE:                                                    Monty Evans is a 29 year old male who presents to clinic today for the following health issues:    Chief Complaint   Patient presents with     Consult     cold sweats, lethargic, sore throat, started yesterday         Oswald is here with URI sx including fever/chills, core throat, fatigue, body aches, headaches.  Bilateral ear pain.  +rhinorrhea.   Denies cough.  No strep expsosures      Did not get flu shot    Girlfriend developed sx as well but no contact in the last week.     OTC: Robitussin    Pt has hx of anaphylaxis - cause unknown - no current epi pen        BP Readings from Last 3 Encounters:   12/17/19 118/70   11/06/19 112/72   01/09/17 120/72    Wt Readings from Last 3 Encounters:   12/17/19 104.3 kg (230 lb)   11/06/19 102.5 kg (226 lb)   01/09/17 107.9 kg (237 lb 12.8 oz)            Patient Active Problem List   Diagnosis     Pain in joint, ankle and foot     Sprain of ankle     Anaphylactic reaction     Health Care Home     CARDIOVASCULAR SCREENING; LDL GOAL LESS THAN 160     Acute left ankle pain     Aftercare following surgery of the musculoskeletal system     ACP (advance care planning)     Personal history of DVT (deep vein thrombosis)     Past Surgical History:   Procedure Laterality Date     ANKLE SURGERY Left 2017     SHOULDER SURGERY  2008    labral tear-right -surgery     SURGICAL HISTORY OF -   3/14/14    Right ACL repair       Social History     Tobacco Use     Smoking status: Never Smoker     Smokeless tobacco: Never Used   Substance Use Topics     Alcohol use: Yes     Alcohol/week: 4.2 standard drinks     Types: 5 drink(s) per week     Comment: 1-2 weekly     Family History   Problem Relation Age of Onset     C.A.D. Maternal Grandmother      Diabetes Maternal Grandmother      Lymphoma Maternal Grandmother      Coronary Artery Disease Maternal Grandfather      Cerebrovascular Disease No family hx of      Cancer - colorectal  No family hx of          Current Outpatient Medications   Medication Sig Dispense Refill     EPINEPHrine (EPIPEN/ADRENACLICK/OR ANY BX GENERIC EQUIV) 0.3 MG/0.3ML injection 2-pack Inject 0.3 mLs (0.3 mg) into the muscle as needed for anaphylaxis 2 each 0     penicillin V (VEETID) 500 MG tablet Take 1 tablet (500 mg) by mouth 2 times daily 20 tablet 0     acetaminophen (TYLENOL) 325 MG tablet Take 2 tablets (650 mg) by mouth every 4 hours as needed for mild pain 100 tablet 0     EPINEPHrine (EPIPEN) 0.3 MG/0.3ML injection Inject 0.3 mLs into the muscle once as needed. 2 each 1       Allergies   Allergen Reactions     Ibuprofen Sodium Anaphylaxis     Bacitracin      Neosporin [Neomycin-Polymyx-Gramicid]        OBJECTIVE:                                                    /70 (BP Location: Left arm, Patient Position: Sitting, Cuff Size: Adult Large)   Pulse 86   Temp 98.8  F (37.1  C) (Oral)   Wt 104.3 kg (230 lb)   SpO2 97%   BMI 31.41 kg/m   Body mass index is 31.41 kg/m .     GENERAL: alert and no distress  HEAD: Normocephalic, atraumatic  EYES: Eyes grossly normal to inspection, extraocular movements - intact  EARS:   Right: External ear and canal normal, TM normal  Left: External ear and canal normal, TM normal  NOSE: No discharge noted  MOUTH/THROAT: no ulcers, no lesions, pharynx  + erythematous, no exudates present, tonsils with  hypertrophy, mucous membranes moist.   NECK: no tenderness, no adenopathy, no asymmetry, no masses, no stiffness; thyroid- normal to palpation  RESP: lungs clear to auscultation - no crackles, no rhonchi, no wheezes  CV: regular rates and rhythm, normal S1 S2, no S3 or S4 and no murmur, no click or rub -         ASSESSMENT/PLAN:                                                      1. Streptococcal sore throat  RTC if sx worsen  - penicillin V (VEETID) 500 MG tablet; Take 1 tablet (500 mg) by mouth 2 times daily  Dispense: 20 tablet; Refill: 0    2. Idiopathic anaphylaxis,  initial encounter  Needs to have Epi Pen  - EPINEPHrine (EPIPEN/ADRENACLICK/OR ANY BX GENERIC EQUIV) 0.3 MG/0.3ML injection 2-pack; Inject 0.3 mLs (0.3 mg) into the muscle as needed for anaphylaxis  Dispense: 2 each; Refill: 0    RTC if not improving    Katherine Arriaga PA-C  [facility]

## 2019-12-17 NOTE — NURSING NOTE
Chief Complaint   Patient presents with     Consult     cold sweats, lethargic, sore throat, started yesterday     Pre-visit Screening:  Immunizations:  up to date  Colonoscopy:  NA  Mammogram: NA  Asthma Action Test/Plan:  NA  PHQ9:  NA  GAD7:  NA  Questioned patient about current smoking habits Pt. No smoking, did chew but quit  Ok to leave detailed message on voice mail for today's visit only yes, phone # [phone]

## 2019-12-20 NOTE — LETTER
December 20, 2019      Monty Evans  [address] [facility] [address]        To Whom It May Concern:    Monty Evans was seen in our clinic. He may return to work without restrictions. Please excuse any absences this week.     Sincerely,        WEN Cummins

## 2020-03-02 ENCOUNTER — HEALTH MAINTENANCE LETTER (OUTPATIENT)
Age: 30
End: 2020-03-02

## 2020-12-14 ENCOUNTER — HEALTH MAINTENANCE LETTER (OUTPATIENT)
Age: 30
End: 2020-12-14

## 2021-01-15 ENCOUNTER — HEALTH MAINTENANCE LETTER (OUTPATIENT)
Age: 31
End: 2021-01-15

## 2021-04-18 ENCOUNTER — HEALTH MAINTENANCE LETTER (OUTPATIENT)
Age: 31
End: 2021-04-18

## 2021-10-02 ENCOUNTER — HEALTH MAINTENANCE LETTER (OUTPATIENT)
Age: 31
End: 2021-10-02

## 2021-10-24 ENCOUNTER — HEALTH MAINTENANCE LETTER (OUTPATIENT)
Age: 31
End: 2021-10-24

## 2022-02-13 ENCOUNTER — HEALTH MAINTENANCE LETTER (OUTPATIENT)
Age: 32
End: 2022-02-13

## 2022-05-14 ENCOUNTER — HEALTH MAINTENANCE LETTER (OUTPATIENT)
Age: 32
End: 2022-05-14

## 2022-09-03 ENCOUNTER — HEALTH MAINTENANCE LETTER (OUTPATIENT)
Age: 32
End: 2022-09-03

## 2022-10-16 ENCOUNTER — HEALTH MAINTENANCE LETTER (OUTPATIENT)
Age: 32
End: 2022-10-16

## 2023-01-13 ENCOUNTER — OFFICE VISIT (OUTPATIENT)
Dept: FAMILY MEDICINE | Facility: CLINIC | Age: 33
End: 2023-01-13

## 2023-01-13 VITALS
HEIGHT: 73 IN | HEART RATE: 68 BPM | WEIGHT: 228 LBS | DIASTOLIC BLOOD PRESSURE: 76 MMHG | OXYGEN SATURATION: 97 % | SYSTOLIC BLOOD PRESSURE: 118 MMHG | TEMPERATURE: 97.7 F | BODY MASS INDEX: 30.22 KG/M2

## 2023-01-13 DIAGNOSIS — Z13.220 SCREENING FOR LIPOID DISORDERS: ICD-10-CM

## 2023-01-13 DIAGNOSIS — Z00.00 ROUTINE GENERAL MEDICAL EXAMINATION AT A HEALTH CARE FACILITY: Primary | ICD-10-CM

## 2023-01-13 DIAGNOSIS — R74.8 ELEVATED LIVER ENZYMES: ICD-10-CM

## 2023-01-13 DIAGNOSIS — R25.1 TREMOR OF UNKNOWN ORIGIN: ICD-10-CM

## 2023-01-13 DIAGNOSIS — Z92.241 HISTORY OF ANABOLIC STEROID USE: ICD-10-CM

## 2023-01-13 DIAGNOSIS — T78.2XXA IDIOPATHIC ANAPHYLAXIS, INITIAL ENCOUNTER: ICD-10-CM

## 2023-01-13 DIAGNOSIS — Z13.228 ENCOUNTER FOR SCREENING FOR OTHER METABOLIC DISORDERS: ICD-10-CM

## 2023-01-13 LAB
ALBUMIN SERPL-MCNC: 4.1 G/DL (ref 3.6–5.1)
ALBUMIN/GLOB SERPL: 1.2 {RATIO} (ref 1–2.5)
ALP SERPL-CCNC: 43 U/L (ref 33–130)
ALT 1742-6: 184 U/L (ref 0–32)
AST 1920-8: 122 U/L (ref 0–35)
BILIRUB SERPL-MCNC: 0.7 MG/DL (ref 0.2–1.2)
BUN SERPL-MCNC: 11 MG/DL (ref 7–25)
BUN/CREATININE RATIO: 12.8 (ref 6–22)
CALCIUM SERPL-MCNC: 9.3 MG/DL (ref 8.6–10.3)
CHLORIDE SERPLBLD-SCNC: 102.4 MMOL/L (ref 98–110)
CHOLEST SERPL-MCNC: 159 MG/DL (ref 0–199)
CHOLEST/HDLC SERPL: 6 {RATIO} (ref 0–5)
CO2 SERPL-SCNC: 30.3 MMOL/L (ref 20–32)
CREAT SERPL-MCNC: 0.86 MG/DL (ref 0.6–1.3)
GLOBULIN, CALCULATED - QUEST: 3.4 (ref 1.9–3.7)
GLUCOSE SERPL-MCNC: 91 MG/DL (ref 60–99)
HDLC SERPL-MCNC: 28 MG/DL (ref 40–150)
LDLC SERPL CALC-MCNC: 101 MG/DL (ref 0–130)
POTASSIUM SERPL-SCNC: 4.94 MMOL/L (ref 3.5–5.3)
PROT SERPL-MCNC: 7.5 G/DL (ref 6.1–8.1)
SODIUM SERPL-SCNC: 138.5 MMOL/L (ref 135–146)
TRIGL SERPL-MCNC: 151 MG/DL (ref 0–149)

## 2023-01-13 PROCEDURE — 99385 PREV VISIT NEW AGE 18-39: CPT | Performed by: PHYSICIAN ASSISTANT

## 2023-01-13 PROCEDURE — 80061 LIPID PANEL: CPT | Performed by: PHYSICIAN ASSISTANT

## 2023-01-13 PROCEDURE — 36415 COLL VENOUS BLD VENIPUNCTURE: CPT | Performed by: PHYSICIAN ASSISTANT

## 2023-01-13 PROCEDURE — 86803 HEPATITIS C AB TEST: CPT | Mod: 90 | Performed by: PHYSICIAN ASSISTANT

## 2023-01-13 PROCEDURE — 87340 HEPATITIS B SURFACE AG IA: CPT | Mod: 90 | Performed by: PHYSICIAN ASSISTANT

## 2023-01-13 PROCEDURE — 80053 COMPREHEN METABOLIC PANEL: CPT | Performed by: PHYSICIAN ASSISTANT

## 2023-01-13 RX ORDER — EPINEPHRINE 0.3 MG/.3ML
0.3 INJECTION SUBCUTANEOUS PRN
Qty: 2 EACH | Refills: 0 | Status: SHIPPED | OUTPATIENT
Start: 2023-01-13 | End: 2024-03-27

## 2023-01-13 NOTE — PROGRESS NOTES
Called patient, unclear origin of liver enzymes, suspect fatty liver vs elevation from previous steroid use. No symptoms at this time, pt dinks rarely and doesn't use Tylenol except sparingly. Will order RUQ US for next step in evaluation.  Ryan Hahn PA-C  [facility]            SUBJECTIVE:   CC: Oswald is an 32 year old who presents for preventative health visit.     Patient has been advised of split billing requirements and indicates understanding: Yes  HPI      Today's PHQ-2 Score:   PHQ-2 ( 1999 Pfizer) 1/13/2023   Q1: Little interest or pleasure in doing things 0   Q2: Feeling down, depressed or hopeless 0   PHQ-2 Score 0     Diet-so-so. Generally homecooked meals. GF is a nutritionist.   Exercise-active with work, working out 2x a week otherwise  Sleep-good. 8hrs/night. No concerns.  BM-daily  Vitamin Use-none at this time  Dentist-2x a year  Eye Doctor-yearly  HIV/Hep C: declines testing today. HM otherwise UTD    Shaking issues continue-has been ongoing for years. Wants to be seen by neurology for this. Hx of anabolic steroid use-stopped 5 months ago, used for 3 months. Wants testosterone checked after using these.      Epi pen: uses for ibuprofen, benadryl, bacitracin allergies. Current one has ran out.    Social History     Tobacco Use     Smoking status: Never     Smokeless tobacco: Former     Types: Chew   Substance Use Topics     Alcohol use: Yes     Alcohol/week: 1.0 - 2.0 standard drink     Types: 1 - 2 drink(s) per week     Comment: 1-2 weekly         No flowsheet data found.    Last PSA: No results found for: PSA    Reviewed orders with patient. Reviewed health maintenance and updated orders accordingly - Yes  Lab work is in process    Reviewed and updated as needed this visit by clinical staff   Tobacco  Allergies  Meds  Problems  Med Hx  Surg Hx  Fam Hx          Reviewed and updated as needed this visit by Provider       Med Hx  Surg Hx  Fam Hx         Past Medical  "History:   Diagnosis Date     DVT, lower extremity (H) 3/19/2014      Past Surgical History:   Procedure Laterality Date     ANKLE SURGERY Left 2017     SHOULDER SURGERY  2008    labral tear-right -surgery     SURGICAL HISTORY OF -   3/14/14    Right ACL repair       Review of Systems  CONSTITUTIONAL: NEGATIVE for fever, chills, change in weight  INTEGUMENTARY/SKIN: NEGATIVE for worrisome rashes, moles or lesions  EYES: NEGATIVE for vision changes or irritation  ENT: NEGATIVE for ear, mouth and throat problems  RESP: NEGATIVE for significant cough or SOB  CV: NEGATIVE for chest pain, palpitations or peripheral edema  GI: NEGATIVE for nausea, abdominal pain, heartburn, or change in bowel habits   male: negative for dysuria, hematuria, decreased urinary stream, erectile dysfunction, urethral discharge  MUSCULOSKELETAL: NEGATIVE for significant arthralgias or myalgia  NEURO: NEGATIVE for weakness, dizziness or paresthesias  PSYCHIATRIC: NEGATIVE for changes in mood or affect    OBJECTIVE:   /76 (BP Location: Left arm, Patient Position: Sitting, Cuff Size: Adult Large)   Pulse 68   Temp 97.7  F (36.5  C) (Temporal)   Ht 1.848 m (6' 0.75\")   Wt 103.4 kg (228 lb)   SpO2 97%   BMI 30.29 kg/m      Physical Exam  GENERAL: healthy, alert and no distress  EYES: Eyes grossly normal to inspection, PERRL and conjunctivae and sclerae normal  HENT: ear canals and TM's normal, nose and mouth without ulcers or lesions  NECK: no adenopathy, no asymmetry, masses, or scars and thyroid normal to palpation  RESP: lungs clear to auscultation - no rales, rhonchi or wheezes  CV: regular rate and rhythm, normal S1 S2, no S3 or S4, no murmur, click or rub, no peripheral edema and peripheral pulses strong  ABDOMEN: soft, nontender, no hepatosplenomegaly, no masses and bowel sounds normal  MS: no gross musculoskeletal defects noted, no edema  SKIN: no suspicious lesions or rashes  PSYCH: mentation appears normal, affect " normal/bright    Diagnostic Test Results:  Labs reviewed in Epic    ASSESSMENT/PLAN:       ICD-10-CM    1. Routine general medical examination at a health care facility  Z00.00 VENOUS COLLECTION     Lipid Panel ([facility])     Comprehensive Metobolic Panel ([facility])      2. Idiopathic anaphylaxis, initial encounter  T78.2XXA EPINEPHrine (ANY BX GENERIC EQUIV) 0.3 MG/0.3ML injection 2-pack      3. History of anabolic steroid use  Z92.241 Adult Endocrinology  Referral - To a [facility] Location (Use POS/Location)      4. Tremor of unknown origin  R25.1 Adult Neurology  Referral - To a [facility] Location      5. Screening for lipoid disorders  Z13.220 VENOUS COLLECTION     Lipid Panel ([facility])      6. Encounter for screening for other metabolic disorders  Z13.228 VENOUS COLLECTION     Comprehensive Metobolic Panel ([facility])          Patient has been advised of split billing requirements and indicates understanding: Yes      COUNSELING:   Reviewed preventive health counseling, as reflected in patient instructions       Regular exercise       Healthy diet/nutrition       Vision screening       Alcohol Use         He reports that he has never smoked. He has quit using smokeless tobacco.  His smokeless tobacco use included chew.            Ryan Hahn PA-C  [facility]

## 2023-01-13 NOTE — NURSING NOTE
Chief Complaint   Patient presents with     Physical     Fasting  Discuss low energy over  Shakiness in hands       Pre-visit Screening:  Immunizations:  up to date  Colonoscopy:  NA  Mammogram: NA  Asthma Action Test/Plan:  NA  PHQ9:  NA  GAD7:  NA  Questioned patient about current smoking habits Pt. has never smoked.  Ok to leave detailed message on voice mail for today's visit only Yes, phone # [phone]

## 2023-01-16 LAB
HB S AG - QUEST: NORMAL
HCV AB - QUEST: NORMAL
SIGNAL TO CUT OFF - QUEST: 0.16

## 2023-01-24 ENCOUNTER — OFFICE VISIT (OUTPATIENT)
Dept: FAMILY MEDICINE | Facility: CLINIC | Age: 33
End: 2023-01-24

## 2023-01-24 VITALS
DIASTOLIC BLOOD PRESSURE: 68 MMHG | HEIGHT: 73 IN | BODY MASS INDEX: 30.35 KG/M2 | TEMPERATURE: 97.4 F | OXYGEN SATURATION: 97 % | HEART RATE: 64 BPM | SYSTOLIC BLOOD PRESSURE: 116 MMHG | WEIGHT: 229 LBS

## 2023-01-24 DIAGNOSIS — R52 BODY ACHES: Primary | ICD-10-CM

## 2023-01-24 DIAGNOSIS — R74.8 ELEVATED LIVER ENZYMES: ICD-10-CM

## 2023-01-24 LAB
ERYTHROCYTE [DISTWIDTH] IN BLOOD BY AUTOMATED COUNT: 11.8 %
HCT VFR BLD AUTO: 44.8 % (ref 40–53)
HEMOGLOBIN: 15 G/DL (ref 13.3–17.7)
MCH RBC QN AUTO: 30.9 PG (ref 26–33)
MCHC RBC AUTO-ENTMCNC: 33.5 G/DL (ref 31–36)
MCV RBC AUTO: 92.4 FL (ref 78–100)
MONONUCLEOSIS SCREEN: NORMAL
PLATELET COUNT - QUEST: 219 10^9/L (ref 150–375)
RBC # BLD AUTO: 4.85 10*12/L (ref 4.4–5.9)
WBC # BLD AUTO: 6.6 10*9/L (ref 4–11)

## 2023-01-24 PROCEDURE — 99213 OFFICE O/P EST LOW 20 MIN: CPT | Performed by: PHYSICIAN ASSISTANT

## 2023-01-24 PROCEDURE — 36415 COLL VENOUS BLD VENIPUNCTURE: CPT | Performed by: PHYSICIAN ASSISTANT

## 2023-01-24 PROCEDURE — 80076 HEPATIC FUNCTION PANEL: CPT | Performed by: PHYSICIAN ASSISTANT

## 2023-01-24 PROCEDURE — 86318 IA INFECTIOUS AGENT ANTIBODY: CPT | Performed by: PHYSICIAN ASSISTANT

## 2023-01-24 PROCEDURE — 85027 COMPLETE CBC AUTOMATED: CPT | Performed by: PHYSICIAN ASSISTANT

## 2023-01-24 NOTE — NURSING NOTE
Chief Complaint   Patient presents with     RECHECK     Discuss ALT and AST lab results from 1/9/23. Pt had a fever and fatigue for a bout a week in Dec 2022.  Pt had mono with similar labs and would like to rule out mono           Pre-visit Screening:  Immunizations:  up to date  Colonoscopy:  NA  Mammogram: NA  Asthma Action Test/Plan:  NA  PHQ9:  NA  GAD7:  NA  Questioned patient about current smoking habits Pt. has never smoked.  Ok to leave detailed message on voice mail for today's visit only Yes, phone # [phone]

## 2023-01-24 NOTE — PROGRESS NOTES
"CC: Mono check    History:  Mono?:  Pt had fever (102), body aches, sore throat, and fatigue for 1 week back in late Dec 2022. Pt brother had similar symptoms and was diagnosed with Mono and had elevated liver labs. Pt himself had CMP checked as part of physical 1/13/2023. Most symptoms have resolved, but still has some fatigue.     PMH, MEDICATIONS, ALLERGIES, SOCIAL AND FAMILY HISTORY in [facility] and reviewed by me personally.    ROS negative other than the symptoms noted above in the HPI.    Examination   /68 (BP Location: Left arm, Patient Position: Sitting, Cuff Size: Adult Large)   Pulse 64   Temp 97.4  F (36.3  C) (Temporal)   Ht 1.848 m (6' 0.75\")   Wt 103.9 kg (229 lb)   SpO2 97%   BMI 30.42 kg/m       Constitutional: Sitting comfortably, in no acute distress. Vital signs noted  Mouth and throat: without erythema or lesions of the mucosa  Neck:  no adenopathy, trachea midline and normal to palpation, thyroid normal to palpation  Cardiovascular:  regular rate and rhythm, no murmurs, clicks, or gallops  Respiratory:  normal respiratory rate and rhythm, lungs clear to auscultation  Abdomen: Abdomen soft, non-tender. BS normal. No masses, organomegaly  SKIN: No jaundice/pallor/rash.   Psychiatric: mentation appears normal and affect normal/bright      A/P    ICD-10-CM    1. Body aches  R52 VENOUS COLLECTION     Hepatic Panel ([facility])     MONO SCREEN ([facility])     Hemogram Platelet ([facility])      2. Elevated liver enzymes  R74.8 VENOUS COLLECTION     Hepatic Panel ([facility])     MONO SCREEN ([facility])     Hemogram Platelet ([facility])          DISCUSSION:  Mono spot test today is negative. Explained to pt that this makes mono less likely, but doesn't completely rule out. Will recheck hepatic panel to see if levels are trending back to normal. Also check CBC today, which was normal/reassuring.     *Update- hepatic panel does show notable improvement since 10 days. I do suspect this is post viral inflammation. It is possible patient " had influenza.    Recommended recheck in 3-6 months to ensure back to normal.     follow up visit: As needed    Rosio Meraz PA-C  [facility]

## 2023-01-25 LAB
ALBUMIN SERPL-MCNC: 4.2 G/DL (ref 3.6–5.1)
ALP SERPL-CCNC: 38 U/L (ref 33–130)
ALT 1742-6: 117 U/L (ref 0–32)
AST 1920-8: 73 U/L (ref 0–35)
BILIRUB SERPL-MCNC: 0.8 MG/DL (ref 0.2–1.2)
BILIRUBIN DIRECT: 0.3 MG/DL (ref 0.1–0.4)
PROT SERPL-MCNC: 7.5 G/DL (ref 6.1–8.1)

## 2023-06-03 ENCOUNTER — HEALTH MAINTENANCE LETTER (OUTPATIENT)
Age: 33
End: 2023-06-03

## 2023-12-05 ENCOUNTER — ANCILLARY PROCEDURE (OUTPATIENT)
Dept: GENERAL RADIOLOGY | Facility: CLINIC | Age: 33
End: 2023-12-05
Attending: PHYSICIAN ASSISTANT
Payer: COMMERCIAL

## 2023-12-05 ENCOUNTER — OFFICE VISIT (OUTPATIENT)
Dept: URGENT CARE | Facility: URGENT CARE | Age: 33
End: 2023-12-05
Payer: COMMERCIAL

## 2023-12-05 VITALS
WEIGHT: 238.7 LBS | OXYGEN SATURATION: 98 % | BODY MASS INDEX: 31.71 KG/M2 | SYSTOLIC BLOOD PRESSURE: 145 MMHG | DIASTOLIC BLOOD PRESSURE: 72 MMHG | TEMPERATURE: 97.8 F | HEART RATE: 68 BPM | RESPIRATION RATE: 17 BRPM

## 2023-12-05 DIAGNOSIS — R05.2 SUBACUTE COUGH: ICD-10-CM

## 2023-12-05 DIAGNOSIS — J01.90 ACUTE SINUSITIS WITH SYMPTOMS > 10 DAYS: Primary | ICD-10-CM

## 2023-12-05 PROCEDURE — 71046 X-RAY EXAM CHEST 2 VIEWS: CPT | Mod: TC | Performed by: RADIOLOGY

## 2023-12-05 PROCEDURE — 99213 OFFICE O/P EST LOW 20 MIN: CPT | Performed by: PHYSICIAN ASSISTANT

## 2023-12-06 NOTE — PATIENT INSTRUCTIONS
1.  Plenty of fluids, rest, warm compresses on face  2.  Mucinex twice daily for at least 4 days  3.  Betsy Pot 1x in the morning 1x at night (SALINE MIST SPRAY IS AN ACCEPTABLE, THOUGH NOT AS EFFECTIVE REPLACEMENT)  4.  Benadryl (diphenhydramine) at bedtime   5.  Either Claritin (Loratadine), Allegra (Fexofenadine), or Zyrtec (Cetirizine) in the day  6.  Flonase (Fluticasone) 2x each nostril twice a day for two weeks, then once each nostril once a day  7. Antibioitic  8. Probiotic 2 hours after each antibiotic dose       Please let us know if symptoms persist, or worsen.

## 2024-01-03 NOTE — PROGRESS NOTES
SUBJECTIVE:   Monty Evans is a 33 year old male presenting with a chief complaint of   deep cough for 1 week throbbing ear pain , goopy eye upon awaking no fever - tx- mucinex   Course of illness is same.    Severity moderate  Current and Associated symptoms: as doris  Treatment measures tried include OTC Cough med.  Predisposing factors include None.    Past Medical History:   Diagnosis Date    DVT, lower extremity (H) 3/19/2014     Current Outpatient Medications   Medication Sig Dispense Refill    EPINEPHrine (ANY BX GENERIC EQUIV) 0.3 MG/0.3ML injection 2-pack Inject 0.3 mLs (0.3 mg) into the muscle as needed for anaphylaxis 2 each 0     Social History     Tobacco Use    Smoking status: Never    Smokeless tobacco: Former     Types: Chew   Substance Use Topics    Alcohol use: Yes     Alcohol/week: 1.0 - 2.0 standard drink of alcohol     Types: 1 - 2 drink(s) per week     Comment: 1-2 weekly       ROS:  Review of systems negative except as stated above.    OBJECTIVE:  BP (!) 145/72   Pulse 68   Temp 97.8  F (36.6  C)   Resp 17   Wt 108.3 kg (238 lb 11.2 oz)   SpO2 98%   BMI 31.71 kg/m    GENERAL APPEARANCE: healthy, alert and no distress  EYES: EOMI,  PERRL, conjunctiva clear  HENT: ear canals and TM's normal.  Nose and mouth without ulcers, erythema or lesions  NECK: supple, nontender, no lymphadenopathy  RESP: lungs clear to auscultation - no rales, rhonchi or wheezes  CV: regular rates and rhythm, normal S1 S2, no murmur noted  NEURO: Normal strength and tone, sensory exam grossly normal,  normal speech and mentation  SKIN: no suspicious lesions or rashes      Results for orders placed or performed in visit on 12/05/23   XR Chest 2 Views     Status: None    Narrative    EXAM: XR CHEST 2 VIEWS  LOCATION: [facility]  DATE: 12/5/2023    INDICATION:  Subacute cough  COMPARISON: None.      Impression    IMPRESSION: Negative chest.       ASSESSMENT:  (J01.90) Acute sinusitis with symptoms  > 10 days  (primary encounter diagnosis)  Plan: amoxicillin-clavulanate (AUGMENTIN) 875-125 MG         tablet       (R05.2) Subacute cough  Plan: XR Chest 2 Views         Patient Instructions   1.  Plenty of fluids, rest, warm compresses on face  2.  Mucinex twice daily for at least 4 days  3.  Betsy Pot 1x in the morning 1x at night (SALINE MIST SPRAY IS AN ACCEPTABLE, THOUGH NOT AS EFFECTIVE REPLACEMENT)  4.  Benadryl (diphenhydramine) at bedtime   5.  Either Claritin (Loratadine), Allegra (Fexofenadine), or Zyrtec (Cetirizine) in the day  6.  Flonase (Fluticasone) 2x each nostril twice a day for two weeks, then once each nostril once a day  7. Antibioitic  8. Probiotic 2 hours after each antibiotic dose       Please let us know if symptoms persist, or worsen.

## 2024-01-26 ENCOUNTER — OFFICE VISIT (OUTPATIENT)
Dept: FAMILY MEDICINE | Facility: CLINIC | Age: 34
End: 2024-01-26

## 2024-01-26 VITALS
DIASTOLIC BLOOD PRESSURE: 74 MMHG | BODY MASS INDEX: 31.48 KG/M2 | SYSTOLIC BLOOD PRESSURE: 128 MMHG | TEMPERATURE: 98.1 F | OXYGEN SATURATION: 96 % | WEIGHT: 237 LBS | HEART RATE: 78 BPM

## 2024-01-26 DIAGNOSIS — G44.89 OTHER HEADACHE SYNDROME: ICD-10-CM

## 2024-01-26 DIAGNOSIS — R05.8 OTHER SPECIFIED COUGH: Primary | ICD-10-CM

## 2024-01-26 PROBLEM — Z47.89 AFTERCARE FOLLOWING SURGERY OF THE MUSCULOSKELETAL SYSTEM: Status: RESOLVED | Noted: 2017-02-16 | Resolved: 2024-01-26

## 2024-01-26 PROBLEM — M25.572 ACUTE LEFT ANKLE PAIN: Status: RESOLVED | Noted: 2017-02-16 | Resolved: 2024-01-26

## 2024-01-26 LAB — COVID-19: NEGATIVE

## 2024-01-26 PROCEDURE — G2211 COMPLEX E/M VISIT ADD ON: HCPCS | Performed by: FAMILY MEDICINE

## 2024-01-26 PROCEDURE — 99215 OFFICE O/P EST HI 40 MIN: CPT | Performed by: FAMILY MEDICINE

## 2024-01-26 PROCEDURE — 87635 SARS-COV-2 COVID-19 AMP PRB: CPT | Performed by: FAMILY MEDICINE

## 2024-01-26 NOTE — PROGRESS NOTES
"Assessment & Plan   Problem List Items Addressed This Visit    None  Visit Diagnoses       Other specified cough    -  Primary    Relevant Orders    COVID-19 ([facility]) (Completed)    Other headache syndrome               1. Other specified cough  Viral infection, continue to treat symptoms. Offered influenza test, but it would be too long after start of symptoms to treat. Information only. Declined.  - COVID-19 ([facility])    2. Other headache syndrome  Patient describes this as severe and \"excruciating\". Has tried only tylenol for this. Said he was told he cannot take ibuprofen. Advised to be seen today in the ER, may need imaging.       He is expected to be a regular patient at our clinic with regular followup on his medical crae.         FUTURE APPOINTMENTS:       - Follow-up visit in Er today then here as needed.    No follow-ups on file.    Ibeth Wallace MD  [facility]    Subjective     Nursing Notes:   Steff Alonso CMA  1/26/2024 12:49 PM  Signed  Chief Complaint   Patient presents with    URI     Last Thursday his coworkers began to get sick, by the weekend he then developed SOB, fatigue,thick green drainage, left sided headache that radiates to his eye, productive cough, body aches/chills, negative covid test last night      Pre-visit Screening:  Immunizations:  up to date  Colonoscopy:  NA  Mammogram: NA  Asthma Action Test/Plan:  NA  PHQ9:  NA  GAD7:  NA  Questioned patient about current smoking habits Pt. has never smoked.  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  Sx include throbbing headache on the left side, through the left eye. Green slimy endless boogers, congestion coughing soreness hot and cold sweats. No fevers. Negative covid test last night, " negative. Coworkers have been negative for covid also. Started last week Saturday, 6 days ago.  He said his headache was bothering him the most, and it's severe.        Review of Systems   Constitutional, HEENT, cardiovascular, pulmonary, gi and gu systems are negative, except as otherwise noted.      Objective    /74 (BP Location: Right arm, Patient Position: Sitting, Cuff Size: Adult Large)   Pulse 78   Temp 98.1  F (36.7  C) (Temporal)   Wt 107.5 kg (237 lb)   SpO2 96%   BMI 31.48 kg/m    Body mass index is 31.48 kg/m .  Physical Exam   GENERAL: alert and no distress  HENT: ear canals and TM's normal, nose and mouth without ulcers or lesions  RESP: lungs clear to auscultation - no rales, rhonchi or wheezes  CV: regular rate and rhythm, normal S1 S2, no S3 or S4, no murmur, click or rub, no peripheral edema  MS: no gross musculoskeletal defects noted, no edema  NEURO: Normal strength and tone, mentation intact and speech normal  PSYCH: mentation appears normal, affect normal/bright    Results for orders placed or performed in visit on 01/26/24   COVID-19 ([facility])     Status: None   Result Value Ref Range    COVID-19 Negative

## 2024-01-26 NOTE — NURSING NOTE
Chief Complaint   Patient presents with    URI     Last Thursday his coworkers began to get sick, by the weekend he then developed SOB, fatigue,thick green drainage, left sided headache that radiates to his eye, productive cough, body aches/chills, negative covid test last night      Pre-visit Screening:  Immunizations:  up to date  Colonoscopy:  NA  Mammogram: NA  Asthma Action Test/Plan:  NA  PHQ9:  NA  GAD7:  NA  Questioned patient about current smoking habits Pt. has never smoked.  Ok to leave detailed message on voice mail for today's visit only Yes, phone # [phone]

## 2024-03-22 DIAGNOSIS — T78.2XXA IDIOPATHIC ANAPHYLAXIS, INITIAL ENCOUNTER: ICD-10-CM

## 2024-03-23 ENCOUNTER — HEALTH MAINTENANCE LETTER (OUTPATIENT)
Age: 34
End: 2024-03-23

## 2024-03-25 NOTE — TELEPHONE ENCOUNTER
Monty Evans is requesting a refill of:    Pending Prescriptions:                       Disp   Refills    EPINEPHrine (ANY BX GENERIC EQUIV) 0.3 MG*                    Sig: INJECT 0.3ML INTO THE MUSCLE AS NEEDED FOR           ANAPHYLAXIS

## 2024-03-27 RX ORDER — EPINEPHRINE 0.3 MG/.3ML
INJECTION SUBCUTANEOUS
Qty: 2 EACH | Refills: 1 | Status: SHIPPED | OUTPATIENT
Start: 2024-03-27

## 2025-04-12 ENCOUNTER — HEALTH MAINTENANCE LETTER (OUTPATIENT)
Age: 35
End: 2025-04-12